# Patient Record
Sex: FEMALE | Race: WHITE | NOT HISPANIC OR LATINO | Employment: OTHER | URBAN - METROPOLITAN AREA
[De-identification: names, ages, dates, MRNs, and addresses within clinical notes are randomized per-mention and may not be internally consistent; named-entity substitution may affect disease eponyms.]

---

## 2017-03-16 ENCOUNTER — OFFICE VISIT (OUTPATIENT)
Dept: URGENT CARE | Facility: CLINIC | Age: 82
End: 2017-03-16
Payer: COMMERCIAL

## 2017-03-16 VITALS
DIASTOLIC BLOOD PRESSURE: 85 MMHG | RESPIRATION RATE: 18 BRPM | OXYGEN SATURATION: 95 % | HEART RATE: 84 BPM | TEMPERATURE: 97.8 F | SYSTOLIC BLOOD PRESSURE: 138 MMHG

## 2017-03-16 DIAGNOSIS — R06.02 SHORTNESS OF BREATH: ICD-10-CM

## 2017-03-16 DIAGNOSIS — J06.9 UPPER RESPIRATORY TRACT INFECTION, UNSPECIFIED TYPE: ICD-10-CM

## 2017-03-16 PROCEDURE — 99214 OFFICE O/P EST MOD 30 MIN: CPT | Mod: 25 | Performed by: PHYSICIAN ASSISTANT

## 2017-03-16 PROCEDURE — 4040F PNEUMOC VAC/ADMIN/RCVD: CPT | Mod: 8P | Performed by: PHYSICIAN ASSISTANT

## 2017-03-16 PROCEDURE — 4004F PT TOBACCO SCREEN RCVD TLK: CPT | Mod: 8P | Performed by: PHYSICIAN ASSISTANT

## 2017-03-16 PROCEDURE — G8421 BMI NOT CALCULATED: HCPCS | Performed by: PHYSICIAN ASSISTANT

## 2017-03-16 PROCEDURE — 94640 AIRWAY INHALATION TREATMENT: CPT | Performed by: PHYSICIAN ASSISTANT

## 2017-03-16 PROCEDURE — 1101F PT FALLS ASSESS-DOCD LE1/YR: CPT | Mod: 8P | Performed by: PHYSICIAN ASSISTANT

## 2017-03-16 PROCEDURE — G8484 FLU IMMUNIZE NO ADMIN: HCPCS | Performed by: PHYSICIAN ASSISTANT

## 2017-03-16 PROCEDURE — G8432 DEP SCR NOT DOC, RNG: HCPCS | Performed by: PHYSICIAN ASSISTANT

## 2017-03-16 RX ORDER — ALBUTEROL SULFATE 90 UG/1
2 AEROSOL, METERED RESPIRATORY (INHALATION) EVERY 6 HOURS PRN
Qty: 8.5 G | Refills: 0 | Status: CANCELLED | OUTPATIENT
Start: 2017-03-16

## 2017-03-16 RX ORDER — AZITHROMYCIN 250 MG/1
TABLET, FILM COATED ORAL
Qty: 6 TAB | Refills: 0 | Status: CANCELLED | OUTPATIENT
Start: 2017-03-16

## 2017-03-16 RX ORDER — AMOXICILLIN 500 MG/1
500 CAPSULE ORAL 2 TIMES DAILY
Qty: 14 CAP | Refills: 0 | Status: SHIPPED | OUTPATIENT
Start: 2017-03-16 | End: 2017-03-23

## 2017-03-16 RX ORDER — BENZONATATE 100 MG/1
100 CAPSULE ORAL 3 TIMES DAILY PRN
Qty: 60 CAP | Refills: 0 | Status: SHIPPED | OUTPATIENT
Start: 2017-03-16 | End: 2019-03-22 | Stop reason: CLARIF

## 2017-03-16 RX ORDER — IPRATROPIUM BROMIDE AND ALBUTEROL SULFATE 2.5; .5 MG/3ML; MG/3ML
3 SOLUTION RESPIRATORY (INHALATION) ONCE
Status: COMPLETED | OUTPATIENT
Start: 2017-03-16 | End: 2017-03-16

## 2017-03-16 RX ORDER — ALBUTEROL SULFATE 90 UG/1
2 AEROSOL, METERED RESPIRATORY (INHALATION) EVERY 6 HOURS PRN
Qty: 8.5 G | Refills: 0 | Status: SHIPPED | OUTPATIENT
Start: 2017-03-16 | End: 2019-03-22 | Stop reason: CLARIF

## 2017-03-16 RX ADMIN — IPRATROPIUM BROMIDE AND ALBUTEROL SULFATE 3 ML: 2.5; .5 SOLUTION RESPIRATORY (INHALATION) at 15:46

## 2017-03-16 NOTE — MR AVS SNAPSHOT
Lita Saunders   3/16/2017 3:15 PM   Office Visit   MRN: [id number]    Department:  [facility]   Dept Phone:  [phone]    Description:  Female : 1931   Provider:  Ana Yap PA-C           Reason for Visit     Cough Almost a week dry cough , chest congestion .      Allergies as of 3/16/2017     No Known Allergies      You were diagnosed with     Upper respiratory tract infection, unspecified type   [0420889]       Shortness of breath   [786.05.ICD-9-CM]         Vital Signs     Blood Pressure Pulse Temperature Respirations Oxygen Saturation       138/85 mmHg 84 36.6 °C (97.8 °F) 18 95%       Basic Information     Date Of Birth Sex Race Ethnicity Preferred Language    1931 Female White Non- English      Health Maintenance        Date Due Completion Dates    IMM DTaP/Tdap/Td Vaccine (1 - Tdap) 1950 ---    PAP SMEAR 1952 ---    MAMMOGRAM 1971 ---    COLONOSCOPY 1981 ---    IMM ZOSTER VACCINE 1991 ---    BONE DENSITY 1996 ---    IMM PNEUMOCOCCAL 65+ (ADULT) LOW/MEDIUM RISK SERIES (1 of 2 - PCV13) 1996 ---    IMM INFLUENZA (1) 2016 ---            Current Immunizations     No immunizations on file.      Below and/or attached are the medications your provider expects you to take. Review all of your home medications and newly ordered medications with your provider and/or pharmacist. Follow medication instructions as directed by your provider and/or pharmacist. Please keep your medication list with you and share with your provider. Update the information when medications are discontinued, doses are changed, or new medications (including over-the-counter products) are added; and carry medication information at all times in the event of emergency situations     Allergies:  No Known Allergies          Medications  Valid as of: 2017 -  5:03 PM    Generic Name Brand Name Tablet Size Instructions for use    Acetaminophen   Take  by  mouth.        Albuterol Sulfate (Aero Soln) albuterol 108 (90 BASE) MCG/ACT Inhale 2 Puffs by mouth every 6 hours as needed for Shortness of Breath.        Amoxicillin (Cap) AMOXIL 500 MG Take 1 Cap by mouth 2 times a day for 7 days.        Aspirin (Chew Tab) ASA 81 MG Take 81 mg by mouth every day.        Benzonatate (Cap) TESSALON 100 MG Take 1 Cap by mouth 3 times a day as needed for Cough.        Calcium   Take  by mouth.        Latanoprost (Solution) XALATAN 0.005 % Place 1 Drop in both eyes every evening.        Levothyroxine Sodium (Tab) SYNTHROID 75 MCG Take 75 mcg by mouth every day.        Metoprolol Succinate (TABLET SR 24 HR) TOPROL XL 50 MG Take 50 mg by mouth every day.        Multiple Vitamin   Take  by mouth.        Non Formulary Request Non Formulary Request  Refresh eye drops        Omega-3 Fatty Acids   Take  by mouth.        Simvastatin   Take  by mouth.        Timolol Maleate   by Ophthalmic route.        Warfarin Sodium (Tab) COUMADIN 5 MG Take 5 mg by mouth every day.        .                 Medicines prescribed today were sent to:     [facility]Y #[id number] - [place], [place] - [address]    [address] [place] [address]    Phone: [phone] Fax: [phone]    Open 24 Hours?: No      Medication refill instructions:       If your prescription bottle indicates you have medication refills left, it is not necessary to call your provider’s office. Please contact your pharmacy and they will refill your medication.    If your prescription bottle indicates you do not have any refills left, you may request refills at any time through one of the following ways: The online [employer] system (except Urgent Care), by calling your provider’s office, or by asking your pharmacy to contact your provider’s office with a refill request. Medication refills are processed only during regular business hours and may not be available until the next business day. Your provider may request additional information  or to have a follow-up visit with you prior to refilling your medication.   *Please Note: Medication refills are assigned a new Rx number when refilled electronically. Your pharmacy may indicate that no refills were authorized even though a new prescription for the same medication is available at the pharmacy. Please request the medicine by name with the pharmacy before contacting your provider for a refill.           [employer] Access Code: [email or web address]  Expires: 4/15/2017  5:03 PM    Your email address is not on file at [facility].  Email Addresses are required for you to sign up for [employer], please contact [phone] to verify your personal information and to provide your email address prior to attempting to register for [employer].    Lita Saunders  [address] [place] [address]    [employer]  A secure, online tool to manage your health information     [facility]’s [employer]® is a secure, online tool that connects you to your personalized health information from the privacy of your home -- day or night - making it very easy for you to manage your healthcare. Once the activation process is completed, you can even access your medical information using the [employer] letty, which is available for free in the Apple Letty store or Google Play store.     To learn more about [employer], visit [email or web address]    There are two levels of access available (as shown below):   My Chart Features  [facility]  Specialists [facility]  Urgent  Care Non-[facility] Primary Care Doctor   Email your healthcare team securely and privately 24/7 X X X    Manage appointments: schedule your next appointment; view details of past/upcoming appointments X      Request prescription refills. X      View recent personal medical records, including lab and immunizations X X X X   View health record, including health history, allergies, medications X X X X   Read reports about your outpatient visits, procedures, consult and  ER notes X X X X   See your discharge summary, which is a recap of your hospital and/or ER visit that includes your diagnosis, lab results, and care plan X X  X     How to register for [employer]:  Once your e-mail address has been verified, follow the following steps to sign up for [employer].     1. Go to  [email or web address]  2. Click on the Sign Up Now box, which takes you to the New Member Sign Up page. You will need to provide the following information:  a. Enter your [employer] Access Code exactly as it appears at the top of this page. (You will not need to use this code after you’ve completed the sign-up process. If you do not sign up before the expiration date, you must request a new code.)   b. Enter your date of birth.   c. Enter your home email address.   d. Click Submit, and follow the next screen’s instructions.  3. Create a [employer] ID. This will be your [employer] login ID and cannot be changed, so think of one that is secure and easy to remember.  4. Create a [employer] password. You can change your password at any time.  5. Enter your Password Reset Question and Answer. This can be used at a later time if you forget your password.   6. Enter your e-mail address. This allows you to receive e-mail notifications when new information is available in [employer].  7. Click Sign Up. You can now view your health information.    For assistance activating your [employer] account, call [phone]

## 2017-03-19 ASSESSMENT — ENCOUNTER SYMPTOMS
ABDOMINAL PAIN: 0
COUGH: 1
CHILLS: 0
VOMITING: 0
NAUSEA: 0
MUSCULOSKELETAL NEGATIVE: 1
SORE THROAT: 0
DIZZINESS: 0
RHINORRHEA: 1
SHORTNESS OF BREATH: 1
HEADACHES: 0
SPUTUM PRODUCTION: 0
DIARRHEA: 0
FEVER: 0

## 2017-03-19 ASSESSMENT — COPD QUESTIONNAIRES: COPD: 0

## 2017-03-19 NOTE — PROGRESS NOTES
Subjective:      Lita Saunders is a 85 y.o. female who presents with Cough            Cough  This is a new problem. The current episode started 1 to 4 weeks ago (1 week). The problem has been gradually worsening. The problem occurs every few minutes. The cough is non-productive. Associated symptoms include nasal congestion, rhinorrhea and shortness of breath. Pertinent negatives include no chest pain, chills, ear pain, fever, headaches or sore throat. The symptoms are aggravated by lying down. She has tried OTC cough suppressant and a beta-agonist inhaler for the symptoms. The treatment provided mild relief. Her past medical history is significant for pneumonia. There is no history of asthma or COPD.   Patient is here visiting family, and reports her cough has gotten significantly worse since getting here, and is causing her SOB. Reports history of pneumonia about 3 months ago that was treated in the outpatient setting. PM includes A fib and she has been taking Coumadin for many years with good control.    Review of Systems   Constitutional: Negative for fever and chills.   HENT: Positive for rhinorrhea. Negative for ear pain and sore throat.    Respiratory: Positive for cough and shortness of breath. Negative for sputum production.    Cardiovascular: Negative for chest pain.   Gastrointestinal: Negative for nausea, vomiting, abdominal pain and diarrhea.   Genitourinary: Negative.    Musculoskeletal: Negative.    Neurological: Negative for dizziness and headaches.   All other systems reviewed and are negative.         Objective:     /85 mmHg  Pulse 84  Temp(Src) 36.6 °C (97.8 °F)  Resp 18  SpO2 95%     Physical Exam   Constitutional: She is oriented to person, place, and time. She appears well-developed and well-nourished. No distress.   HENT:   Head: Normocephalic and atraumatic.   Right Ear: Hearing, tympanic membrane, external ear and ear canal normal.   Left Ear: Hearing, tympanic membrane, external  ear and ear canal normal.   Nose: Mucosal edema and rhinorrhea present.   Mouth/Throat: Oropharynx is clear and moist. No oropharyngeal exudate.   Eyes: Conjunctivae are normal. Pupils are equal, round, and reactive to light. Right eye exhibits no discharge. Left eye exhibits no discharge.   Neck: Normal range of motion.   Cardiovascular: Normal rate, regular rhythm and normal heart sounds.    No murmur heard.  Pulmonary/Chest: Effort normal. No respiratory distress. She has wheezes. She has rales.   Musculoskeletal: Normal range of motion.   Lymphadenopathy:     She has no cervical adenopathy.   Neurological: She is alert and oriented to person, place, and time.   Skin: Skin is warm and dry. She is not diaphoretic.   Psychiatric: She has a normal mood and affect. Her behavior is normal.   Nursing note and vitals reviewed.         .PMH:  has no past medical history on file.  MEDS:   Current outpatient prescriptions:   •  benzonatate (TESSALON) 100 MG Cap, Take 1 Cap by mouth 3 times a day as needed for Cough., Disp: 60 Cap, Rfl: 0  •  albuterol 108 (90 BASE) MCG/ACT Aero Soln inhalation aerosol, Inhale 2 Puffs by mouth every 6 hours as needed for Shortness of Breath., Disp: 8.5 g, Rfl: 0  •  amoxicillin (AMOXIL) 500 MG Cap, Take 1 Cap by mouth 2 times a day for 7 days., Disp: 14 Cap, Rfl: 0  •  metoprolol SR (TOPROL XL) 50 MG TB24, Take 50 mg by mouth every day., Disp: , Rfl:   •  Simvastatin (ZOCOR PO), Take  by mouth., Disp: , Rfl:   •  warfarin (COUMADIN) 5 MG TABS, Take 5 mg by mouth every day., Disp: , Rfl:   •  levothyroxine (SYNTHROID) 75 MCG TABS, Take 75 mcg by mouth every day., Disp: , Rfl:   •  latanoprost (XALATAN) 0.005 % SOLN, Place 1 Drop in both eyes every evening., Disp: , Rfl:   •  TIMOLOL MALEATE OP, by Ophthalmic route., Disp: , Rfl:   •  aspirin (ASA) 81 MG CHEW chewable tablet, Take 81 mg by mouth every day., Disp: , Rfl:   •  Acetaminophen (TYLENOL PO), Take  by mouth., Disp: , Rfl:   •   CALCIUM PO, Take  by mouth., Disp: , Rfl:   •  Multiple Vitamin (MULTI-VITAMIN PO), Take  by mouth., Disp: , Rfl:   •  Omega-3 Fatty Acids (OMEGA 3 PO), Take  by mouth., Disp: , Rfl:   •  Non Formulary Request, Refresh eye drops, Disp: , Rfl:   ALLERGIES: No Known Allergies  SURGHX: History reviewed. No pertinent past surgical history.  SOCHX:    FH: family history is not on file.       Assessment/Plan:     1. Upper respiratory tract infection, unspecified type  - benzonatate (TESSALON) 100 MG Cap; Take 1 Cap by mouth 3 times a day as needed for Cough.  Dispense: 60 Cap; Refill: 0  - amoxicillin (AMOXIL) 500 MG Cap; Take 1 Cap by mouth 2 times a day for 7 days.  Dispense: 14 Cap; Refill: 0   - Complete full course of antibiotics as prescribed       2. Shortness of breath  - ipratropium-albuterol (DUONEB) nebulizer solution 3 mL; 3 mL by Nebulization route Once.   - Given in clinic  - albuterol 108 (90 BASE) MCG/ACT Aero Soln inhalation aerosol; Inhale 2 Puffs by mouth every 6 hours as needed for Shortness of Breath.  Dispense: 8.5 g; Refill: 0      Call or return to office if symptoms persist or worsen

## 2019-03-22 ENCOUNTER — APPOINTMENT (OUTPATIENT)
Dept: RADIOLOGY | Facility: MEDICAL CENTER | Age: 84
DRG: 291 | End: 2019-03-22
Attending: EMERGENCY MEDICINE
Payer: MEDICARE

## 2019-03-22 ENCOUNTER — HOSPITAL ENCOUNTER (INPATIENT)
Facility: MEDICAL CENTER | Age: 84
LOS: 1 days | DRG: 291 | End: 2019-03-23
Attending: EMERGENCY MEDICINE | Admitting: INTERNAL MEDICINE
Payer: MEDICARE

## 2019-03-22 DIAGNOSIS — I50.9 ACUTE ON CHRONIC CONGESTIVE HEART FAILURE, UNSPECIFIED HEART FAILURE TYPE (HCC): ICD-10-CM

## 2019-03-22 DIAGNOSIS — R04.2 HEMOPTYSIS: ICD-10-CM

## 2019-03-22 DIAGNOSIS — J90 BILATERAL PLEURAL EFFUSION: ICD-10-CM

## 2019-03-22 DIAGNOSIS — R06.00 DYSPNEA, UNSPECIFIED TYPE: ICD-10-CM

## 2019-03-22 PROBLEM — I50.30 DIASTOLIC HEART FAILURE (HCC): Status: ACTIVE | Noted: 2019-03-22

## 2019-03-22 PROBLEM — I10 HYPERTENSION: Status: ACTIVE | Noted: 2019-03-22

## 2019-03-22 PROBLEM — J96.00 ACUTE RESPIRATORY FAILURE (HCC): Status: ACTIVE | Noted: 2019-03-22

## 2019-03-22 PROBLEM — J96.01 ACUTE RESPIRATORY FAILURE WITH HYPOXIA (HCC): Status: ACTIVE | Noted: 2019-03-22

## 2019-03-22 PROBLEM — I48.91 ATRIAL FIBRILLATION (HCC): Status: ACTIVE | Noted: 2019-03-22

## 2019-03-22 LAB
ALBUMIN SERPL BCP-MCNC: 3.4 G/DL (ref 3.2–4.9)
ALBUMIN/GLOB SERPL: 1 G/DL
ALP SERPL-CCNC: 58 U/L (ref 30–99)
ALT SERPL-CCNC: 21 U/L (ref 2–50)
ANION GAP SERPL CALC-SCNC: 5 MMOL/L (ref 0–11.9)
APTT PPP: 39.4 SEC (ref 24.7–36)
AST SERPL-CCNC: 30 U/L (ref 12–45)
BASOPHILS # BLD AUTO: 0.8 % (ref 0–1.8)
BASOPHILS # BLD: 0.06 K/UL (ref 0–0.12)
BILIRUB SERPL-MCNC: 0.9 MG/DL (ref 0.1–1.5)
BNP SERPL-MCNC: 393 PG/ML (ref 0–100)
BUN SERPL-MCNC: 16 MG/DL (ref 8–22)
CALCIUM SERPL-MCNC: 8.6 MG/DL (ref 8.4–10.2)
CHLORIDE SERPL-SCNC: 101 MMOL/L (ref 96–112)
CO2 SERPL-SCNC: 29 MMOL/L (ref 20–33)
CREAT SERPL-MCNC: 0.51 MG/DL (ref 0.5–1.4)
EKG IMPRESSION: NORMAL
EOSINOPHIL # BLD AUTO: 0.09 K/UL (ref 0–0.51)
EOSINOPHIL NFR BLD: 1.3 % (ref 0–6.9)
ERYTHROCYTE [DISTWIDTH] IN BLOOD BY AUTOMATED COUNT: 55.2 FL (ref 35.9–50)
GLOBULIN SER CALC-MCNC: 3.4 G/DL (ref 1.9–3.5)
GLUCOSE SERPL-MCNC: 115 MG/DL (ref 65–99)
HCT VFR BLD AUTO: 42.8 % (ref 37–47)
HGB BLD-MCNC: 13.5 G/DL (ref 12–16)
IMM GRANULOCYTES # BLD AUTO: 0.02 K/UL (ref 0–0.11)
IMM GRANULOCYTES NFR BLD AUTO: 0.3 % (ref 0–0.9)
INR PPP: 1.89 (ref 0.87–1.13)
LYMPHOCYTES # BLD AUTO: 0.84 K/UL (ref 1–4.8)
LYMPHOCYTES NFR BLD: 11.7 % (ref 22–41)
MCH RBC QN AUTO: 32.8 PG (ref 27–33)
MCHC RBC AUTO-ENTMCNC: 31.5 G/DL (ref 33.6–35)
MCV RBC AUTO: 103.9 FL (ref 81.4–97.8)
MONOCYTES # BLD AUTO: 0.81 K/UL (ref 0–0.85)
MONOCYTES NFR BLD AUTO: 11.3 % (ref 0–13.4)
NEUTROPHILS # BLD AUTO: 5.38 K/UL (ref 2–7.15)
NEUTROPHILS NFR BLD: 74.6 % (ref 44–72)
NRBC # BLD AUTO: 0 K/UL
NRBC BLD-RTO: 0 /100 WBC
PLATELET # BLD AUTO: 147 K/UL (ref 164–446)
PMV BLD AUTO: 10.4 FL (ref 9–12.9)
POTASSIUM SERPL-SCNC: 4.1 MMOL/L (ref 3.6–5.5)
PROCALCITONIN SERPL-MCNC: <0.05 NG/ML
PROT SERPL-MCNC: 6.8 G/DL (ref 6–8.2)
PROTHROMBIN TIME: 21.5 SEC (ref 12–14.6)
RBC # BLD AUTO: 4.12 M/UL (ref 4.2–5.4)
SODIUM SERPL-SCNC: 135 MMOL/L (ref 135–145)
TROPONIN I SERPL-MCNC: <0.02 NG/ML (ref 0–0.04)
WBC # BLD AUTO: 7.2 K/UL (ref 4.8–10.8)

## 2019-03-22 PROCEDURE — 85610 PROTHROMBIN TIME: CPT

## 2019-03-22 PROCEDURE — 700111 HCHG RX REV CODE 636 W/ 250 OVERRIDE (IP): Performed by: INTERNAL MEDICINE

## 2019-03-22 PROCEDURE — 770020 HCHG ROOM/CARE - TELE (206)

## 2019-03-22 PROCEDURE — 85025 COMPLETE CBC W/AUTO DIFF WBC: CPT

## 2019-03-22 PROCEDURE — A9270 NON-COVERED ITEM OR SERVICE: HCPCS | Performed by: INTERNAL MEDICINE

## 2019-03-22 PROCEDURE — 85730 THROMBOPLASTIN TIME PARTIAL: CPT

## 2019-03-22 PROCEDURE — 36415 COLL VENOUS BLD VENIPUNCTURE: CPT

## 2019-03-22 PROCEDURE — 304561 HCHG STAT O2

## 2019-03-22 PROCEDURE — 93005 ELECTROCARDIOGRAM TRACING: CPT | Performed by: EMERGENCY MEDICINE

## 2019-03-22 PROCEDURE — 84484 ASSAY OF TROPONIN QUANT: CPT

## 2019-03-22 PROCEDURE — 99285 EMERGENCY DEPT VISIT HI MDM: CPT

## 2019-03-22 PROCEDURE — 99223 1ST HOSP IP/OBS HIGH 75: CPT | Mod: AI | Performed by: INTERNAL MEDICINE

## 2019-03-22 PROCEDURE — 84145 PROCALCITONIN (PCT): CPT

## 2019-03-22 PROCEDURE — 71045 X-RAY EXAM CHEST 1 VIEW: CPT

## 2019-03-22 PROCEDURE — 700102 HCHG RX REV CODE 250 W/ 637 OVERRIDE(OP): Performed by: INTERNAL MEDICINE

## 2019-03-22 PROCEDURE — 80053 COMPREHEN METABOLIC PANEL: CPT

## 2019-03-22 PROCEDURE — 700117 HCHG RX CONTRAST REV CODE 255: Performed by: EMERGENCY MEDICINE

## 2019-03-22 PROCEDURE — 71275 CT ANGIOGRAPHY CHEST: CPT

## 2019-03-22 PROCEDURE — 83880 ASSAY OF NATRIURETIC PEPTIDE: CPT

## 2019-03-22 RX ORDER — LEVOTHYROXINE SODIUM 88 UG/1
88 TABLET ORAL
COMMUNITY

## 2019-03-22 RX ORDER — IRBESARTAN 75 MG/1
75 TABLET ORAL NIGHTLY
COMMUNITY

## 2019-03-22 RX ORDER — WARFARIN SODIUM 5 MG/1
2.5-5 TABLET ORAL DAILY
COMMUNITY
Start: 2019-02-01

## 2019-03-22 RX ORDER — BISACODYL 10 MG
10 SUPPOSITORY, RECTAL RECTAL
Status: DISCONTINUED | OUTPATIENT
Start: 2019-03-22 | End: 2019-03-23 | Stop reason: HOSPADM

## 2019-03-22 RX ORDER — POLYETHYLENE GLYCOL 3350 17 G/17G
1 POWDER, FOR SOLUTION ORAL
Status: DISCONTINUED | OUTPATIENT
Start: 2019-03-22 | End: 2019-03-23 | Stop reason: HOSPADM

## 2019-03-22 RX ORDER — AMOXICILLIN 250 MG
2 CAPSULE ORAL 2 TIMES DAILY
Status: DISCONTINUED | OUTPATIENT
Start: 2019-03-22 | End: 2019-03-23 | Stop reason: HOSPADM

## 2019-03-22 RX ORDER — LATANOPROST 50 UG/ML
1 SOLUTION/ DROPS OPHTHALMIC
COMMUNITY

## 2019-03-22 RX ORDER — HYDRALAZINE HYDROCHLORIDE 20 MG/ML
10 INJECTION INTRAMUSCULAR; INTRAVENOUS EVERY 4 HOURS PRN
Status: DISCONTINUED | OUTPATIENT
Start: 2019-03-22 | End: 2019-03-23 | Stop reason: HOSPADM

## 2019-03-22 RX ORDER — WARFARIN SODIUM 5 MG/1
5 TABLET ORAL
Status: COMPLETED | OUTPATIENT
Start: 2019-03-22 | End: 2019-03-22

## 2019-03-22 RX ORDER — BUDESONIDE AND FORMOTEROL FUMARATE DIHYDRATE 80; 4.5 UG/1; UG/1
2 AEROSOL RESPIRATORY (INHALATION)
Status: DISCONTINUED | OUTPATIENT
Start: 2019-03-23 | End: 2019-03-23 | Stop reason: HOSPADM

## 2019-03-22 RX ORDER — LEVOTHYROXINE SODIUM 88 UG/1
88 TABLET ORAL
Status: DISCONTINUED | OUTPATIENT
Start: 2019-03-23 | End: 2019-03-23 | Stop reason: HOSPADM

## 2019-03-22 RX ORDER — ROSUVASTATIN CALCIUM 10 MG/1
20 TABLET, COATED ORAL EVERY EVENING
Status: DISCONTINUED | OUTPATIENT
Start: 2019-03-22 | End: 2019-03-23 | Stop reason: HOSPADM

## 2019-03-22 RX ORDER — CHLORAL HYDRATE 500 MG
1000 CAPSULE ORAL
COMMUNITY

## 2019-03-22 RX ORDER — METOPROLOL SUCCINATE 50 MG/1
75 TABLET, EXTENDED RELEASE ORAL EVERY MORNING
COMMUNITY

## 2019-03-22 RX ORDER — BUDESONIDE AND FORMOTEROL FUMARATE DIHYDRATE 80; 4.5 UG/1; UG/1
2 AEROSOL RESPIRATORY (INHALATION) 2 TIMES DAILY
COMMUNITY

## 2019-03-22 RX ORDER — DORZOLAMIDE HYDROCHLORIDE AND TIMOLOL MALEATE 20; 5 MG/ML; MG/ML
1 SOLUTION/ DROPS OPHTHALMIC 2 TIMES DAILY
COMMUNITY

## 2019-03-22 RX ORDER — ROSUVASTATIN CALCIUM 20 MG/1
20 TABLET, COATED ORAL EVERY EVENING
COMMUNITY
Start: 2019-02-06

## 2019-03-22 RX ORDER — POTASSIUM CHLORIDE 20 MEQ/1
40 TABLET, EXTENDED RELEASE ORAL DAILY
Status: DISCONTINUED | OUTPATIENT
Start: 2019-03-22 | End: 2019-03-23

## 2019-03-22 RX ORDER — METOPROLOL SUCCINATE 25 MG/1
75 TABLET, EXTENDED RELEASE ORAL EVERY MORNING
Status: DISCONTINUED | OUTPATIENT
Start: 2019-03-23 | End: 2019-03-23 | Stop reason: HOSPADM

## 2019-03-22 RX ORDER — DORZOLAMIDE HYDROCHLORIDE AND TIMOLOL MALEATE 20; 5 MG/ML; MG/ML
1 SOLUTION/ DROPS OPHTHALMIC 2 TIMES DAILY
Status: DISCONTINUED | OUTPATIENT
Start: 2019-03-22 | End: 2019-03-23 | Stop reason: HOSPADM

## 2019-03-22 RX ORDER — LATANOPROST 50 UG/ML
1 SOLUTION/ DROPS OPHTHALMIC
Status: DISCONTINUED | OUTPATIENT
Start: 2019-03-22 | End: 2019-03-23 | Stop reason: HOSPADM

## 2019-03-22 RX ORDER — ACETAMINOPHEN 500 MG
1000 TABLET ORAL EVERY 6 HOURS PRN
COMMUNITY

## 2019-03-22 RX ORDER — IRBESARTAN 150 MG/1
75 TABLET ORAL NIGHTLY
Status: DISCONTINUED | OUTPATIENT
Start: 2019-03-22 | End: 2019-03-23 | Stop reason: HOSPADM

## 2019-03-22 RX ORDER — ACETAMINOPHEN 325 MG/1
650 TABLET ORAL EVERY 6 HOURS PRN
Status: DISCONTINUED | OUTPATIENT
Start: 2019-03-22 | End: 2019-03-23 | Stop reason: HOSPADM

## 2019-03-22 RX ORDER — FUROSEMIDE 10 MG/ML
40 INJECTION INTRAMUSCULAR; INTRAVENOUS DAILY
Status: DISCONTINUED | OUTPATIENT
Start: 2019-03-22 | End: 2019-03-23

## 2019-03-22 RX ORDER — ONDANSETRON 4 MG/1
4 TABLET, ORALLY DISINTEGRATING ORAL EVERY 4 HOURS PRN
Status: DISCONTINUED | OUTPATIENT
Start: 2019-03-22 | End: 2019-03-23 | Stop reason: HOSPADM

## 2019-03-22 RX ORDER — M-VIT,TX,IRON,MINS/CALC/FOLIC 27MG-0.4MG
1 TABLET ORAL DAILY
COMMUNITY

## 2019-03-22 RX ORDER — ONDANSETRON 2 MG/ML
4 INJECTION INTRAMUSCULAR; INTRAVENOUS EVERY 4 HOURS PRN
Status: DISCONTINUED | OUTPATIENT
Start: 2019-03-22 | End: 2019-03-23 | Stop reason: HOSPADM

## 2019-03-22 RX ADMIN — POTASSIUM CHLORIDE 40 MEQ: 1500 TABLET, EXTENDED RELEASE ORAL at 19:22

## 2019-03-22 RX ADMIN — FUROSEMIDE 40 MG: 10 INJECTION, SOLUTION INTRAMUSCULAR; INTRAVENOUS at 19:21

## 2019-03-22 RX ADMIN — LATANOPROST 1 DROP: 50 SOLUTION/ DROPS OPHTHALMIC at 20:43

## 2019-03-22 RX ADMIN — ROSUVASTATIN CALCIUM 20 MG: 10 TABLET, FILM COATED ORAL at 19:23

## 2019-03-22 RX ADMIN — WARFARIN SODIUM 5 MG: 5 TABLET ORAL at 19:22

## 2019-03-22 RX ADMIN — IRBESARTAN 75 MG: 150 TABLET ORAL at 20:43

## 2019-03-22 RX ADMIN — IOHEXOL 100 ML: 350 INJECTION, SOLUTION INTRAVENOUS at 14:04

## 2019-03-22 RX ADMIN — DORZOLAMIDE HYDROCHLORIDE AND TIMOLOL MALEATE 1 DROP: 20; 5 SOLUTION/ DROPS OPHTHALMIC at 19:28

## 2019-03-22 RX ADMIN — GUAIFENESIN 200 MG: 100 SOLUTION ORAL at 22:04

## 2019-03-22 ASSESSMENT — CHA2DS2 SCORE
DIABETES: NO
SEX: FEMALE
AGE 75 OR GREATER: YES
CHF OR LEFT VENTRICULAR DYSFUNCTION: NO
PRIOR STROKE OR TIA OR THROMBOEMBOLISM: NO
CHA2DS2 VASC SCORE: 4
HYPERTENSION: YES
VASCULAR DISEASE: NO
AGE 65 TO 74: NO

## 2019-03-22 ASSESSMENT — COPD QUESTIONNAIRES
DO YOU EVER COUGH UP ANY MUCUS OR PHLEGM?: NO/ONLY WITH OCCASIONAL COLDS OR INFECTIONS
DURING THE PAST 4 WEEKS HOW MUCH DID YOU FEEL SHORT OF BREATH: SOME OF THE TIME
HAVE YOU SMOKED AT LEAST 100 CIGARETTES IN YOUR ENTIRE LIFE: NO/DON'T KNOW
COPD SCREENING SCORE: 3

## 2019-03-22 ASSESSMENT — COGNITIVE AND FUNCTIONAL STATUS - GENERAL
CLIMB 3 TO 5 STEPS WITH RAILING: A LITTLE
DAILY ACTIVITIY SCORE: 24
MOBILITY SCORE: 23
SUGGESTED CMS G CODE MODIFIER DAILY ACTIVITY: CH
SUGGESTED CMS G CODE MODIFIER MOBILITY: CI

## 2019-03-22 ASSESSMENT — ENCOUNTER SYMPTOMS
SHORTNESS OF BREATH: 1
DEPRESSION: 0
HEADACHES: 0
ABDOMINAL PAIN: 0
LOSS OF CONSCIOUSNESS: 0
MYALGIAS: 0
DIARRHEA: 0
COUGH: 1
SPUTUM PRODUCTION: 0
VOMITING: 0
STRIDOR: 0
CONSTIPATION: 0
TINGLING: 0
PALPITATIONS: 0
CHILLS: 0
FALLS: 0
WEAKNESS: 0
NAUSEA: 0
DIZZINESS: 0
FEVER: 0

## 2019-03-22 ASSESSMENT — LIFESTYLE VARIABLES
CONSUMPTION TOTAL: INCOMPLETE
DO YOU DRINK ALCOHOL: NO
HOW MANY TIMES IN THE PAST YEAR HAVE YOU HAD 5 OR MORE DRINKS IN A DAY: 0
ALCOHOL_USE: YES
EVER HAD A DRINK FIRST THING IN THE MORNING TO STEADY YOUR NERVES TO GET RID OF A HANGOVER: NO
TOTAL SCORE: 0
TOTAL SCORE: 0
ON A TYPICAL DAY WHEN YOU DRINK ALCOHOL HOW MANY DRINKS DO YOU HAVE: 1
HAVE PEOPLE ANNOYED YOU BY CRITICIZING YOUR DRINKING: NO
TOTAL SCORE: 0
EVER_SMOKED: NEVER
TOTAL SCORE: 0
EVER FELT BAD OR GUILTY ABOUT YOUR DRINKING: NO
AVERAGE NUMBER OF DAYS PER WEEK YOU HAVE A DRINK CONTAINING ALCOHOL: 4
HAVE YOU EVER FELT YOU SHOULD CUT DOWN ON YOUR DRINKING: NO
EVER FELT BAD OR GUILTY ABOUT YOUR DRINKING: NO
EVER HAD A DRINK FIRST THING IN THE MORNING TO STEADY YOUR NERVES TO GET RID OF A HANGOVER: NO
ALCOHOL_USE: YES
EVER_SMOKED: NEVER
CONSUMPTION TOTAL: NEGATIVE
HAVE YOU EVER FELT YOU SHOULD CUT DOWN ON YOUR DRINKING: NO
TOTAL SCORE: 0
TOTAL SCORE: 0
HAVE PEOPLE ANNOYED YOU BY CRITICIZING YOUR DRINKING: NO

## 2019-03-22 ASSESSMENT — PATIENT HEALTH QUESTIONNAIRE - PHQ9
1. LITTLE INTEREST OR PLEASURE IN DOING THINGS: NOT AT ALL
SUM OF ALL RESPONSES TO PHQ9 QUESTIONS 1 AND 2: 0
2. FEELING DOWN, DEPRESSED, IRRITABLE, OR HOPELESS: NOT AT ALL

## 2019-03-22 NOTE — ED NOTES
Med Rec completed per patient  Allergies reviewed  No ORAL antibiotics in last 30 days    Patient takes Coumadin as follows:2.5 MG MON,WED,FRI  5 MG all other days, last INR check 3 weeks ago

## 2019-03-22 NOTE — ED PROVIDER NOTES
"ED Provider Note    CHIEF COMPLAINT  Chief Complaint   Patient presents with   • Cough       HPI  Lita Saunders is a 87 y.o. female who to the emergency department through triage with family members for cough and shortness of breath.  Patient with history of \"interstitial lung disease\" denies diagnosis specifically for emphysema, COPD, asthma.  She has had previous \"heart failure\" as well, no longer taking a diuretic.  Patient is visiting from [place], she has been here for 3 weeks, she describes now 1 week of nonproductive cough and some shortness of breath.  Both seem worse at night although noticeable with some exertion as well.  URI symptoms mild.  No fever.  Some increased lower extremity edema although mostly baseline for this patient.  Patient became concerned with episode of hemoptysis this morning.  No chest pain, palpitations.  No syncope.  No other bleeding diatheses, hematuria, rectal bleeding, gum bleeding.    Patient is on Coumadin for atrial fibrillation.  INR reportedly normal prior to her trip to [place].    REVIEW OF SYSTEMS  See HPI for further details. All other systems are negative.     PAST MEDICAL HISTORY   has a past medical history of Atrial fibrillation (HCC) and Hypertension.    SOCIAL HISTORY  Social History     Social History Main Topics   • Smoking status: Never Smoker   • Smokeless tobacco: Never Used   • Alcohol use Yes      Comment: Occasionally   • Drug use: No   • Sexual activity: Not on file       SURGICAL HISTORY  patient denies any surgical history    CURRENT MEDICATIONS  Home Medications     Reviewed by Yuan Umanzor (Pharmacy Tech) on 03/22/19 at 1236  Med List Status: Complete   Medication Last Dose Status   acetaminophen (TYLENOL) 500 MG Tab 3/21/2019 Active   aspirin EC (ECOTRIN) 81 MG Tablet Delayed Response 3/22/2019 Active   budesonide-formoterol (SYMBICORT) 80-4.5 MCG/ACT Aerosol 3/22/2019 Active   Calcium Carb-Cholecalciferol (CALCIUM-VITAMIN D) 600-400 MG-UNIT " "Tab 3/21/2019 Active   dorzolamide-timolol (COSOPT) 22.3-6.8 MG/ML Solution 3/22/2019 Active   irbesartan (AVAPRO) 75 MG tablet 3/21/2019 Active   latanoprost (XALATAN) 0.005 % Solution 3/21/2019 Active   levothyroxine (SYNTHROID) 88 MCG Tab 3/22/2019 Active   metoprolol SR (TOPROL XL) 50 MG TABLET SR 24 HR 3/22/2019 Active   Omega-3 Fatty Acids (FISH OIL) 1000 MG Cap capsule 3/22/2019 Active   rosuvastatin (CRESTOR) 20 MG Tab 3/21/2019 Active   therapeutic multivitamin-minerals (THERAGRAN-M) Tab 3/21/2019 Active   warfarin (COUMADIN) 5 MG Tab 3/21/2019 Active                ALLERGIES  No Known Allergies    PHYSICAL EXAM  VITAL SIGNS: BP (!) 171/98   Pulse 63   Temp 36.6 °C (97.8 °F) (Temporal)   Resp (!) 22   Ht 1.753 m (5' 9\")   Wt 77 kg (169 lb 12.1 oz)   SpO2 97%   BMI 25.07 kg/m²   Pulse ox interpretation: I interpret this pulse ox as normal.  Constitutional: Alert in no apparent distress.  HENT: Normocephalic, atraumatic. Bilateral external ears normal, Nose normal.  Slightly dry mucous membranes.    Eyes: Pupils are equal and reactive, Conjunctiva normal.   Neck: Normal range of motion, Supple.  Mild JVD.  Lymphatic: No lymphadenopathy noted.   Cardiovascular: Regular rate and rhythm, no murmurs. Distal pulses intact.  1+ pitting bilateral lower extremity pedal edema.  Thorax & Lungs: Diminished bilaterally, greatest in the bases.  No wheezing/rales/ronchi. No increased work of breathing, clipped speech or retractions.   Abdomen: Soft, non-distended, non-tender to palpation. No palpable or pulsatile masses.   Skin: Warm, Dry, No erythema, No rash.   Musculoskeletal: Good range of motion in all major joints.  Neurologic: Alert and oriented x4.  Moves 4 extremity spontaneously.  Psychiatric: Affect normal, Judgment normal, Mood normal.       DIAGNOSTIC STUDIES / PROCEDURES    LABS  Results for orders placed or performed during the hospital encounter of 03/22/19   CBC w/ Differential   Result Value Ref " Range    WBC 7.2 4.8 - 10.8 K/uL    RBC 4.12 (L) 4.20 - 5.40 M/uL    Hemoglobin 13.5 12.0 - 16.0 g/dL    Hematocrit 42.8 37.0 - 47.0 %    .9 (H) 81.4 - 97.8 fL    MCH 32.8 27.0 - 33.0 pg    MCHC 31.5 (L) 33.6 - 35.0 g/dL    RDW 55.2 (H) 35.9 - 50.0 fL    Platelet Count 147 (L) 164 - 446 K/uL    MPV 10.4 9.0 - 12.9 fL    Neutrophils-Polys 74.60 (H) 44.00 - 72.00 %    Lymphocytes 11.70 (L) 22.00 - 41.00 %    Monocytes 11.30 0.00 - 13.40 %    Eosinophils 1.30 0.00 - 6.90 %    Basophils 0.80 0.00 - 1.80 %    Immature Granulocytes 0.30 0.00 - 0.90 %    Nucleated RBC 0.00 /100 WBC    Neutrophils (Absolute) 5.38 2.00 - 7.15 K/uL    Lymphs (Absolute) 0.84 (L) 1.00 - 4.80 K/uL    Monos (Absolute) 0.81 0.00 - 0.85 K/uL    Eos (Absolute) 0.09 0.00 - 0.51 K/uL    Baso (Absolute) 0.06 0.00 - 0.12 K/uL    Immature Granulocytes (abs) 0.02 0.00 - 0.11 K/uL    NRBC (Absolute) 0.00 K/uL   Complete Metabolic Panel (CMP)   Result Value Ref Range    Sodium 135 135 - 145 mmol/L    Potassium 4.1 3.6 - 5.5 mmol/L    Chloride 101 96 - 112 mmol/L    Co2 29 20 - 33 mmol/L    Anion Gap 5.0 0.0 - 11.9    Glucose 115 (H) 65 - 99 mg/dL    Bun 16 8 - 22 mg/dL    Creatinine 0.51 0.50 - 1.40 mg/dL    Calcium 8.6 8.4 - 10.2 mg/dL    AST(SGOT) 30 12 - 45 U/L    ALT(SGPT) 21 2 - 50 U/L    Alkaline Phosphatase 58 30 - 99 U/L    Total Bilirubin 0.9 0.1 - 1.5 mg/dL    Albumin 3.4 3.2 - 4.9 g/dL    Total Protein 6.8 6.0 - 8.2 g/dL    Globulin 3.4 1.9 - 3.5 g/dL    A-G Ratio 1.0 g/dL   Btype Natriuretic Peptide   Result Value Ref Range    B Natriuretic Peptide 393 (H) 0 - 100 pg/mL   Troponin STAT   Result Value Ref Range    Troponin I <0.02 0.00 - 0.04 ng/mL   APTT   Result Value Ref Range    APTT 39.4 (H) 24.7 - 36.0 sec   PT/INR   Result Value Ref Range    PT 21.5 (H) 12.0 - 14.6 sec    INR 1.89 (H) 0.87 - 1.13   ESTIMATED GFR   Result Value Ref Range    GFR If African American >60 >60 mL/min/1.73 m 2    GFR If Non  >60 >60  mL/min/1.73 m 2   EKG   Result Value Ref Range    Report       [facility] Emergency Dept.    Test Date:  2019  Pt Name:    CAMERON KWOK                Department: [facility]  MRN:        [id number]                      Room:       -ROOM 1  Gender:     Female                       Technician: CARTER  :        [id number]                   Requested By:AMBER SALDANA  Order #:    [id number]                    Reading MD: AMBER SALDANA DO    Measurements  Intervals                                Axis  Rate:       69                           P:  NV:                                      QRS:        -48  QRSD:       144                          T:          110  QT:         427  QTc:        458    Interpretive Statements  Atrial fibrillation  Ventricular premature complex  Left bundle branch block  No previous ECG available for comparison    Electronically Signed On 3- 16:29:54 PDT by AMBER SALDANA DO         RADIOLOGY  CT-CTA CHEST PULMONARY ARTERY W/ RECONS   Final Result      1.  No CT evidence for pulmonary emboli.   2.  Multichamber cardiac enlargement primarily involving the atria with findings suggesting pulmonary hypertension.   3.  Patchy hazy opacities throughout both lungs which may indicate pneumonitis and/or interstitial lung disease.   4.  Small bilateral pleural effusions, larger on the RIGHT.            DX-CHEST-PORTABLE (1 VIEW)   Final Result      Interstitial greater than consolidative edema with underlying interstitial lung disease not excluded. Superimposed pneumonia is also possible      Marked cardiac silhouette enlargement            COURSE & MEDICAL DECISION MAKING  Nursing notes and vital signs were reviewed. (See chart for details)  The patients records were reviewed, history was obtained from the patient;     Differential diagnosis includes bronchitis, pneumonia, pulmonary embolism, pulmonary edema.    ED evaluation for cough, shortness of breath and  episode of hemoptysis most concerning for pulmonary edema, suspect CHF exacerbation, given patient's history for the same.  She does have interstitial lung disease, no clinical evidence for acute COPD exacerbation, however given mostly nonproductive cough I initially suspected this may be a bronchitis.  Evidence for pulmonary edema, bilateral pleural effusions, elevated BNP and peripheral edema with notable shortness of breath more likely CHF exacerbation.  Patient is no longer on diuretic,, she is symptomatic, I believe she does need IV diuresis before stable for discharge.  EKG atrial fibrillation, rate controlled, this is chronic, left bundle branch block, unknown chronicity.  However patient denies chest pain.  No evidence for STEMI, troponin normal.  No further evidence for pneumonia or sepsis.  No pulmonary embolism despite somewhat subtherapeutic INR.  Patient is somewhat hypertensive, but afebrile.  Never hypoxic, on baseline supplemental oxygen.  She is aware the findings and agreeable to the disposition plan.    Dr. Singh is aware of the patient and agreeable to admission.    FINAL IMPRESSION  (I50.9) Acute on chronic congestive heart failure, unspecified heart failure type (HCC)  (J90) Bilateral pleural effusion  (R06.00) Dyspnea, unspecified type  (R04.2) Hemoptysis      Electronically signed by: Muna Aranda, 3/22/2019 4:25 PM      This dictation was created using voice recognition software. The accuracy of the dictation is limited to the abilities of the software. I expect there may be some errors of grammar and possibly content. The nursing notes were reviewed and certain aspects of this information were incorporated into this note.

## 2019-03-22 NOTE — ED TRIAGE NOTES
"Pt is accompanied by her family.  She is visiting her son in [place], from [place].  She reports a hx of Afib, on chronic Coumadin therapy.  She also reports being affected by \"Interstitial Lung Disease\", and on chronic O2 supplementation.  She presents today complaining of a productive cough worsening for the past 3 weeks and acute onset of hemoptysis today.   Chief Complaint   Patient presents with   • Cough     BP (!) 171/98   Pulse 100   Temp 36.6 °C (97.8 °F) (Temporal)   Resp 20   Ht 1.753 m (5' 9\")   Wt 77 kg (169 lb 12.1 oz)   SpO2 92%   BMI 25.07 kg/m²     "

## 2019-03-23 VITALS
DIASTOLIC BLOOD PRESSURE: 88 MMHG | SYSTOLIC BLOOD PRESSURE: 131 MMHG | BODY MASS INDEX: 25.36 KG/M2 | WEIGHT: 167.33 LBS | RESPIRATION RATE: 20 BRPM | HEART RATE: 66 BPM | TEMPERATURE: 97.8 F | HEIGHT: 68 IN | OXYGEN SATURATION: 95 %

## 2019-03-23 LAB
ANION GAP SERPL CALC-SCNC: 9 MMOL/L (ref 0–11.9)
BUN SERPL-MCNC: 10 MG/DL (ref 8–22)
CALCIUM SERPL-MCNC: 9.3 MG/DL (ref 8.4–10.2)
CHLORIDE SERPL-SCNC: 100 MMOL/L (ref 96–112)
CO2 SERPL-SCNC: 32 MMOL/L (ref 20–33)
CREAT SERPL-MCNC: 0.56 MG/DL (ref 0.5–1.4)
ERYTHROCYTE [DISTWIDTH] IN BLOOD BY AUTOMATED COUNT: 55.8 FL (ref 35.9–50)
GLUCOSE SERPL-MCNC: 104 MG/DL (ref 65–99)
HCT VFR BLD AUTO: 40.4 % (ref 37–47)
HGB BLD-MCNC: 12.9 G/DL (ref 12–16)
INR PPP: 1.96 (ref 0.87–1.13)
MCH RBC QN AUTO: 33.2 PG (ref 27–33)
MCHC RBC AUTO-ENTMCNC: 31.9 G/DL (ref 33.6–35)
MCV RBC AUTO: 103.9 FL (ref 81.4–97.8)
PLATELET # BLD AUTO: 148 K/UL (ref 164–446)
PMV BLD AUTO: 10.6 FL (ref 9–12.9)
POTASSIUM SERPL-SCNC: 3.7 MMOL/L (ref 3.6–5.5)
PROTHROMBIN TIME: 22.1 SEC (ref 12–14.6)
RBC # BLD AUTO: 3.89 M/UL (ref 4.2–5.4)
SODIUM SERPL-SCNC: 141 MMOL/L (ref 135–145)
WBC # BLD AUTO: 7.2 K/UL (ref 4.8–10.8)

## 2019-03-23 PROCEDURE — 700102 HCHG RX REV CODE 250 W/ 637 OVERRIDE(OP): Performed by: INTERNAL MEDICINE

## 2019-03-23 PROCEDURE — A9270 NON-COVERED ITEM OR SERVICE: HCPCS | Performed by: INTERNAL MEDICINE

## 2019-03-23 PROCEDURE — 700111 HCHG RX REV CODE 636 W/ 250 OVERRIDE (IP): Performed by: INTERNAL MEDICINE

## 2019-03-23 PROCEDURE — 85610 PROTHROMBIN TIME: CPT

## 2019-03-23 PROCEDURE — 85027 COMPLETE CBC AUTOMATED: CPT

## 2019-03-23 PROCEDURE — 80048 BASIC METABOLIC PNL TOTAL CA: CPT

## 2019-03-23 PROCEDURE — 99239 HOSP IP/OBS DSCHRG MGMT >30: CPT | Performed by: HOSPITALIST

## 2019-03-23 RX ORDER — FUROSEMIDE 10 MG/ML
40 INJECTION INTRAMUSCULAR; INTRAVENOUS
Status: DISCONTINUED | OUTPATIENT
Start: 2019-03-23 | End: 2019-03-23 | Stop reason: HOSPADM

## 2019-03-23 RX ORDER — FUROSEMIDE 40 MG/1
40 TABLET ORAL DAILY
Qty: 30 TAB | Refills: 1 | Status: SHIPPED | OUTPATIENT
Start: 2019-03-23

## 2019-03-23 RX ORDER — POTASSIUM CHLORIDE 20 MEQ/1
40 TABLET, EXTENDED RELEASE ORAL DAILY
Qty: 30 TAB | Refills: 1 | Status: SHIPPED | OUTPATIENT
Start: 2019-03-23

## 2019-03-23 RX ORDER — WARFARIN SODIUM 2 MG/1
4 TABLET ORAL
Status: DISCONTINUED | OUTPATIENT
Start: 2019-03-23 | End: 2019-03-23 | Stop reason: HOSPADM

## 2019-03-23 RX ORDER — POTASSIUM CHLORIDE 20 MEQ/1
40 TABLET, EXTENDED RELEASE ORAL 2 TIMES DAILY
Status: DISCONTINUED | OUTPATIENT
Start: 2019-03-23 | End: 2019-03-23 | Stop reason: HOSPADM

## 2019-03-23 RX ADMIN — POTASSIUM CHLORIDE 40 MEQ: 1500 TABLET, EXTENDED RELEASE ORAL at 06:12

## 2019-03-23 RX ADMIN — FUROSEMIDE 40 MG: 10 INJECTION, SOLUTION INTRAMUSCULAR; INTRAVENOUS at 06:13

## 2019-03-23 RX ADMIN — GUAIFENESIN 200 MG: 100 SOLUTION ORAL at 06:23

## 2019-03-23 RX ADMIN — METOPROLOL SUCCINATE 75 MG: 25 TABLET, EXTENDED RELEASE ORAL at 05:47

## 2019-03-23 RX ADMIN — ASPIRIN 81 MG: 81 TABLET, COATED ORAL at 05:48

## 2019-03-23 RX ADMIN — LEVOTHYROXINE SODIUM 88 MCG: 88 TABLET ORAL at 05:48

## 2019-03-23 RX ADMIN — DORZOLAMIDE HYDROCHLORIDE AND TIMOLOL MALEATE 1 DROP: 20; 5 SOLUTION/ DROPS OPHTHALMIC at 05:50

## 2019-03-23 NOTE — PROGRESS NOTES
Telemetry Shift Summary    Rhythm SR  HR Range 60-72  Ectopy Rare PVC  Measurements 0.14/0.08/0.44        Normal Values  Rhythm SR  HR Range    Measurements 0.12-0.20 / 0.06-0.10  / 0.30-0.52

## 2019-03-23 NOTE — PROGRESS NOTES
Tele Summary:    Rhythm: A fib  Rate: 60s-70s  Ectopy: frequent PVCs, rare couplet, rare bigeminy  Measurements: -/.12/-    Tele summary placed in pt chart

## 2019-03-23 NOTE — ED NOTES
Pt transported to floor via gu; on zoll. Pt assisted to hosp bed via front-wheel walker, 02 2L via nc, stand-by assist

## 2019-03-23 NOTE — PROGRESS NOTES
Report received Ayana ADAMES. Assumed care of pt. A/O x4. VSS. Responds appropriately. Denies pain, mild SOB with exertion. Assessment completed. Explained importance of calling before getting OOB. Call light and belongings within reach. Bed in the lowest position. Treaded socks in place. Hourly rounding in progress. Safety precautions in place

## 2019-03-23 NOTE — ASSESSMENT & PLAN NOTE
-Due to bilateral pleural effusions, right greater than left  -Continue oxygen, wean as able, patient quickly desaturates without

## 2019-03-23 NOTE — PROGRESS NOTES
Inpatient Anticoagulation Service Note    Date: 3/22/2019  Reason for Anticoagulation: Atrial Fibrillation   [id number] VASc Score: 4        Hemoglobin Value: 13.5  Hematocrit Value: 42.8  Lab Platelet Value: (!) 147  Target INR: 2.0 to 3.0    INR from last 7 days     Date/Time INR Value    03/22/19 1225 (!)  1.89        Dose from last 7 days     Date/Time Dose (mg)    03/22/19 1742  5        Significant Interactions: Aspirin, Statin  Bridge Therapy: No (If less than 5 days and overlap therapy discontinued -- document reason (i.e. Bleed Risk))    (If still on overlap therapy, if No -- document reason (i.e. Bleed Risk))    Comments: Home dose 2.5 mg MWF, 5 mg on all other days.    Plan:  Will give warfarin 5 mg po tonight for INR of 1.89 per home regimen  Education Material Provided?: No (long term warfarin patient.)  Pharmacist suggested discharge dosing: Resume home regimen.     Kin Orozco [facility]

## 2019-03-23 NOTE — PROGRESS NOTES
Received report and assumed care of pt. Pt is alert and oriented with family at bedside. Discussed POC with pt and family. Gave night medications. Pt has no complaints of pain at this time. She calls appropriately when in need of assistance and before attempting to get out of bed.

## 2019-03-23 NOTE — DISCHARGE INSTRUCTIONS
Discharge Instructions    Discharged to home by car with relative. Discharged via wheelchair, hospital escort: Yes.  Special equipment needed: Oxygen    Be sure to schedule a follow-up appointment with your primary care doctor or any specialists as instructed.     Discharge Plan:   Diet Plan: Discussed  Activity Level: Discussed  Confirmed Follow up Appointment: Patient to Call and Schedule Appointment  Confirmed Symptoms Management: Discussed  Medication Reconciliation Updated: Yes  Pneumococcal Vaccine Administered/Refused: Not given - Patient refused pneumococcal vaccine  Influenza Vaccine Indication: Not indicated: Previously immunized this influenza season and > 8 years of age    I understand that a diet low in cholesterol, fat, and sodium is recommended for good health. Unless I have been given specific instructions below for another diet, I accept this instruction as my diet prescription.   Other diet: Cardiac low sodium    Special Instructions: None      · Is patient discharged on Warfarin / Coumadin?   Yes    You are receiving the drug warfarin. Please understand the importance of monitoring warfarin with scheduled PT/INR blood draws.  Follow-up with a call to your personal Doctor's office in 3 days to schedule a PT/INR.    IMPORTANT: HOW TO USE THIS INFORMATION:  This is a summary and does NOT have all possible information about this product. This information does not assure that this product is safe, effective, or appropriate for you. This information is not individual medical advice and does not substitute for the advice of your health care professional. Always ask your health care professional for complete information about this product and your specific health needs.      WARFARIN - ORAL (WARF-uh-rin)      COMMON BRAND NAME(S): Coumadin      WARNING:  Warfarin can cause very serious (possibly fatal) bleeding. This is more likely to occur when you first start taking this medication or if you take too  "much warfarin. To decrease your risk for bleeding, your doctor or other health care provider will monitor you closely and check your lab results (INR test) to make sure you are not taking too much warfarin. Keep all medical and laboratory appointments. Tell your doctor right away if you notice any signs of serious bleeding. See also Side Effects section.      USES:  This medication is used to treat blood clots (such as in deep vein thrombosis-DVT or pulmonary embolus-PE) and/or to prevent new clots from forming in your body. Preventing harmful blood clots helps to reduce the risk of a stroke or heart attack. Conditions that increase your risk of developing blood clots include a certain type of irregular heart rhythm (atrial fibrillation), heart valve replacement, recent heart attack, and certain surgeries (such as hip/knee replacement). Warfarin is commonly called a \"blood thinner,\" but the more correct term is \"anticoagulant.\" It helps to keep blood flowing smoothly in your body by decreasing the amount of certain substances (clotting proteins) in your blood.      HOW TO USE:  Read the Medication Guide provided by your pharmacist before you start taking warfarin and each time you get a refill. If you have any questions, ask your doctor or pharmacist. Take this medication by mouth with or without food as directed by your doctor or other health care professional, usually once a day. It is very important to take it exactly as directed. Do not increase the dose, take it more frequently, or stop using it unless directed by your doctor. Dosage is based on your medical condition, laboratory tests (such as INR), and response to treatment. Your doctor or other health care provider will monitor you closely while you are taking this medication to determine the right dose for you. Use this medication regularly to get the most benefit from it. To help you remember, take it at the same time each day. It is important to eat a " balanced, consistent diet while taking warfarin. Some foods can affect how warfarin works in your body and may affect your treatment and dose. Avoid sudden large increases or decreases in your intake of foods high in vitamin K (such as broccoli, cauliflower, cabbage, brussels sprouts, kale, spinach, and other green leafy vegetables, liver, green tea, certain vitamin supplements). If you are trying to lose weight, check with your doctor before you try to go on a diet. Cranberry products may also affect how your warfarin works. Limit the amount of cranberry juice (16 ounces/480 milliliters a day) or other cranberry products you may drink or eat.      SIDE EFFECTS:  Nausea, loss of appetite, or stomach/abdominal pain may occur. If any of these effects persist or worsen, tell your doctor or pharmacist promptly. Remember that your doctor has prescribed this medication because he or she has judged that the benefit to you is greater than the risk of side effects. Many people using this medication do not have serious side effects. This medication can cause serious bleeding if it affects your blood clotting proteins too much (shown by unusually high INR lab results). Even if your doctor stops your medication, this risk of bleeding can continue for up to a week. Tell your doctor right away if you have any signs of serious bleeding, including: unusual pain/swelling/discomfort, unusual/easy bruising, prolonged bleeding from cuts or gums, persistent/frequent nosebleeds, unusually heavy/prolonged menstrual flow, pink/dark urine, coughing up blood, vomit that is bloody or looks like coffee grounds, severe headache, dizziness/fainting, unusual or persistent tiredness/weakness, bloody/black/tarry stools, chest pain, shortness of breath, difficulty swallowing. Tell your doctor right away if any of these unlikely but serious side effects occur: persistent nausea/vomiting, severe stomach/abdominal pain, yellowing eyes/skin. This drug  rarely has caused very serious (possibly fatal) problems if its effects lead to small blood clots (usually at the beginning of treatment). This can lead to severe skin/tissue damage that may require surgery or amputation if left untreated. Patients with certain blood conditions (protein C or S deficiency) may be at greater risk. Get medical help right away if any of these rare but serious side effects occur: painful/red/purplish patches on the skin (such as on the toe, breast, abdomen), change in the amount of urine, vision changes, confusion, slurred speech, weakness on one side of the body. A very serious allergic reaction to this drug is rare. However, get medical help right away if you notice any symptoms of a serious allergic reaction, including: rash, itching/swelling (especially of the face/tongue/throat), severe dizziness, trouble breathing. This is not a complete list of possible side effects. If you notice other effects not listed above, contact your doctor or pharmacist. In the [place] - Call your doctor for medical advice about side effects. You may report side effects to FDA at 4-701-VSB-5249. In [place] - Call your doctor for medical advice about side effects. You may report side effects to [facility] [place] at 1-427.954.5453.      PRECAUTIONS:  Before taking warfarin, tell your doctor or pharmacist if you are allergic to it; or if you have any other allergies. This product may contain inactive ingredients, which can cause allergic reactions or other problems. Talk to your pharmacist for more details. Before using this medication, tell your doctor or pharmacist your medical history, especially of: blood disorders (such as anemia, hemophilia), bleeding problems (such as bleeding of the stomach/intestines, bleeding in the brain), blood vessel disorders (such as aneurysms), recent major injury/surgery, liver disease, alcohol use, mental/mood disorders (including memory problems), frequent falls/injuries. It is  important that all your doctors and dentists know that you take warfarin. Before having surgery or any medical/dental procedures, tell your doctor or dentist that you are taking this medication and about all the products you use (including prescription drugs, nonprescription drugs, and herbal products). Avoid getting injections into the muscles. If you must have an injection into a muscle (for example, a flu shot), it should be given in the arm. This way, it will be easier to check for bleeding and/or apply pressure bandages. This medication may cause stomach bleeding. Daily use of alcohol while using this medicine will increase your risk for stomach bleeding and may also affect how this medication works. Limit or avoid alcoholic beverages. If you have not been eating well, if you have an illness or infection that causes fever, vomiting, or diarrhea for more than 2 days, or if you start using any antibiotic medications, contact your doctor or pharmacist immediately because these conditions can affect how warfarin works. This medication can cause heavy bleeding. To lower the chance of getting cut, bruised, or injured, use great caution with sharp objects like safety razors and nail cutters. Use an electric razor when shaving and a soft toothbrush when brushing your teeth. Avoid activities such as contact sports. If you fall or injure yourself, especially if you hit your head, call your doctor immediately. Your doctor may need to check you. The Food & Drug Administration has stated that generic warfarin products are interchangeable. However, consult your doctor or pharmacist before switching warfarin products. Be careful not to take more than one medication that contains warfarin unless specifically directed by the doctor or health care provider who is monitoring your warfarin treatment. Older adults may be at greater risk for bleeding while using this drug. This medication is not recommended for use during pregnancy  "because of serious (possibly fatal) harm to an unborn baby. Discuss the use of reliable forms of birth control with your doctor. If you become pregnant or think you may be pregnant, tell your doctor immediately. If you are planning pregnancy, discuss a plan for managing your condition with your doctor before you become pregnant. Your doctor may switch the type of medication you use during pregnancy. Very small amounts of this medication may pass into breast milk but is unlikely to harm a nursing infant. Consult your doctor before breast-feeding.      DRUG INTERACTIONS:  Drug interactions may change how your medications work or increase your risk for serious side effects. This document does not contain all possible drug interactions. Keep a list of all the products you use (including prescription/nonprescription drugs and herbal products) and share it with your doctor and pharmacist. Do not start, stop, or change the dosage of any medicines without your doctor's approval. Warfarin interacts with many prescription, nonprescription, vitamin, and herbal products. This includes medications that are applied to the skin or inside the vagina or rectum. The interactions with warfarin usually result in an increase or decrease in the \"blood-thinning\" (anticoagulant) effect. Your doctor or other health care professional should closely monitor you to prevent serious bleeding or clotting problems. While taking warfarin, it is very important to tell your doctor or pharmacist of any changes in medications, vitamins, or herbal products that you are taking. Some products that may interact with this drug include: capecitabine, imatinib, mifepristone. Aspirin, aspirin-like drugs (salicylates), and nonsteroidal anti-inflammatory drugs (NSAIDs such as ibuprofen, naproxen, celecoxib) may have effects similar to warfarin. These drugs may increase the risk of bleeding problems if taken during treatment with warfarin. Carefully check all " prescription/nonprescription product labels (including drugs applied to the skin such as pain-relieving creams) since the products may contain NSAIDs or salicylates. Talk to your doctor about using a different medication (such as acetaminophen) to treat pain/fever. Low-dose aspirin and related drugs (such as clopidogrel, ticlopidine) should be continued if prescribed by your doctor for specific medical reasons such as heart attack or stroke prevention. Consult your doctor or pharmacist for more details. Many herbal products interact with warfarin. Tell your doctor before taking any herbal products, especially bromelains, coenzyme Q10, cranberry, danshen, dong quai, fenugreek, garlic, ginkgo biloba, ginseng, and Pepper Pike's wort, among others. This medication may interfere with a certain laboratory test to measure theophylline levels, possibly causing false test results. Make sure laboratory personnel and all your doctors know you use this drug.      OVERDOSE:  If overdose is suspected, contact a poison control center or emergency room immediately. [place] residents can call the [place] National Poison Hotline at 1-198.522.9176. [place] residents can call a provincial poison control center. Symptoms of overdose may include: bloody/black/tarry stools, pink/dark urine, unusual/prolonged bleeding.      NOTES:  Do not share this medication with others. Laboratory and/or medical tests (such as INR, complete blood count) must be performed periodically to monitor your progress or check for side effects. Consult your doctor for more details.      MISSED DOSE:  For the best possible benefit, do not miss any doses. If you do miss a dose and remember on the same day, take it as soon as you remember. If you remember on the next day, skip the missed dose and resume your usual dosing schedule. Do not double the dose to catch up because this could increase your risk for bleeding. Keep a record of missed doses to give to your doctor or  pharmacist. Contact your doctor or pharmacist if you miss 2 or more doses in a row.      STORAGE:  Store at room temperature away from light and moisture. Do not store in the bathroom. Keep all medications away from children and pets. Do not flush medications down the toilet or pour them into a drain unless instructed to do so. Properly discard this product when it is  or no longer needed. Consult your pharmacist or local waste disposal company for more details about how to safely discard your product.      MEDICAL ALERT:  Your condition and medication can cause complications in a medical emergency. For information about enrolling in MedicAlert, call 1-571.171.8644 ([place]) or 1-641.355.4385 ([place]).      Information last revised 2010 Copyright(c)  [employer]           Heart Failure  Heart failure means your heart has trouble pumping blood. This makes it hard for your body to work well. Heart failure is usually a long-term (chronic) condition. You must take good care of yourself and follow your doctor's treatment plan.  HOME CARE  · Take your heart medicine as told by your doctor.  ¨ Do not stop taking medicine unless your doctor tells you to.  ¨ Do not skip any dose of medicine.  ¨ Refill your medicines before they run out.  ¨ Take other medicines only as told by your doctor or pharmacist.  · Stay active if told by your doctor. The elderly and people with severe heart failure should talk with a doctor about physical activity.  · Eat heart-healthy foods. Choose foods that are without trans fat and are low in saturated fat, cholesterol, and salt (sodium). This includes fresh or frozen fruits and vegetables, fish, lean meats, fat-free or low-fat dairy foods, whole grains, and high-fiber foods. Lentils and dried peas and beans (legumes) are also good choices.  · Limit salt if told by your doctor.  · Cook in a healthy way. Roast, grill, broil, bake, poach, steam, or stir-woodward foods.  · Limit  fluids as told by your doctor.  · Weigh yourself every morning. Do this after you pee (urinate) and before you eat breakfast. Write down your weight to give to your doctor.  · Take your blood pressure and write it down if your doctor tells you to.  · Ask your doctor how to check your pulse. Check your pulse as told.  · Lose weight if told by your doctor.  · Stop smoking or chewing tobacco. Do not use gum or patches that help you quit without your doctor's approval.  · Schedule and go to doctor visits as told.  · Nonpregnant women should have no more than 1 drink a day. Men should have no more than 2 drinks a day. Talk to your doctor about drinking alcohol.  · Stop illegal drug use.  · Stay current with shots (immunizations).  · Manage your health conditions as told by your doctor.  · Learn to manage your stress.  · Rest when you are tired.  · If it is really hot outside:  ¨ Avoid intense activities.  ¨ Use air conditioning or fans, or get in a cooler place.  ¨ Avoid caffeine and alcohol.  ¨ Wear loose-fitting, lightweight, and light-colored clothing.  · If it is really cold outside:  ¨ Avoid intense activities.  ¨ Layer your clothing.  ¨ Wear mittens or gloves, a hat, and a scarf when going outside.  ¨ Avoid alcohol.  · Learn about heart failure and get support as needed.  · Get help to maintain or improve your quality of life and your ability to care for yourself as needed.  GET HELP IF:   · You gain weight quickly.  · You are more short of breath than usual.  · You cannot do your normal activities.  · You tire easily.  · You cough more than normal, especially with activity.  · You have any or more puffiness (swelling) in areas such as your hands, feet, ankles, or belly (abdomen).  · You cannot sleep because it is hard to breathe.  · You feel like your heart is beating fast (palpitations).  · You get dizzy or light-headed when you stand up.  GET HELP RIGHT AWAY IF:   · You have trouble breathing.  · There is a  change in mental status, such as becoming less alert or not being able to focus.  · You have chest pain or discomfort.  · You faint.  MAKE SURE YOU:   · Understand these instructions.  · Will watch your condition.  · Will get help right away if you are not doing well or get worse.  This information is not intended to replace advice given to you by your health care provider. Make sure you discuss any questions you have with your health care provider.  Document Released: 09/26/2009 Document Revised: 01/08/2016 Document Reviewed: 02/03/2014  [employer] Interactive Patient Education © 2017 [employer]      Furosemide tablets (Take with Potassium as prescribed)  What is this medicine?  FUROSEMIDE (fyoor [place] se mide) is a diuretic. It helps you make more urine and to lose salt and excess water from your body. This medicine is used to treat high blood pressure, and edema or swelling from heart, kidney, or liver disease.  This medicine may be used for other purposes; ask your health care provider or pharmacist if you have questions.  COMMON BRAND NAME(S): Active-Medicated Specimen Kit, Delone, Diuscreen, Lasix, RX Specimen Collection Kit, Specimen Collection Kit, URINX Medicated Specimen Collection  What should I tell my health care provider before I take this medicine?  They need to know if you have any of these conditions:  -abnormal blood electrolytes  -diarrhea or vomiting  -gout  -heart disease  -kidney disease, small amounts of urine, or difficulty passing urine  -liver disease  -thyroid disease  -an unusual or allergic reaction to furosemide, sulfa drugs, other medicines, foods, dyes, or preservatives  -pregnant or trying to get pregnant  -breast-feeding  How should I use this medicine?  Take this medicine by mouth with a glass of water. Follow the directions on the prescription label. You may take this medicine with or without food. If it upsets your stomach, take it with food or milk. Do not take your medicine more  often than directed. Remember that you will need to pass more urine after taking this medicine. Do not take your medicine at a time of day that will cause you problems. Do not take at bedtime.  Talk to your pediatrician regarding the use of this medicine in children. While this drug may be prescribed for selected conditions, precautions do apply.  Overdosage: If you think you have taken too much of this medicine contact a poison control center or emergency room at once.  NOTE: This medicine is only for you. Do not share this medicine with others.  What if I miss a dose?  If you miss a dose, take it as soon as you can. If it is almost time for your next dose, take only that dose. Do not take double or extra doses.  What may interact with this medicine?  -aspirin and aspirin-like medicines  -certain antibiotics  -chloral hydrate  -cisplatin  -cyclosporine  -digoxin  -diuretics  -laxatives  -lithium  -medicines for blood pressure  -medicines that relax muscles for surgery  -methotrexate  -NSAIDs, medicines for pain and inflammation like ibuprofen, naproxen, or indomethacin  -phenytoin  -steroid medicines like prednisone or cortisone  -sucralfate  -thyroid hormones  This list may not describe all possible interactions. Give your health care provider a list of all the medicines, herbs, non-prescription drugs, or dietary supplements you use. Also tell them if you smoke, drink alcohol, or use illegal drugs. Some items may interact with your medicine.  What should I watch for while using this medicine?  Visit your doctor or health care professional for regular checks on your progress. Check your blood pressure regularly. Ask your doctor or health care professional what your blood pressure should be, and when you should contact him or her. If you are a diabetic, check your blood sugar as directed.  You may need to be on a special diet while taking this medicine. Check with your doctor. Also, ask how many glasses of fluid you  need to drink a day. You must not get dehydrated.  You may get drowsy or dizzy. Do not drive, use machinery, or do anything that needs mental alertness until you know how this drug affects you. Do not stand or sit up quickly, especially if you are an older patient. This reduces the risk of dizzy or fainting spells. Alcohol can make you more drowsy and dizzy. Avoid alcoholic drinks.  This medicine can make you more sensitive to the sun. Keep out of the sun. If you cannot avoid being in the sun, wear protective clothing and use sunscreen. Do not use sun lamps or tanning beds/booths.  What side effects may I notice from receiving this medicine?  Side effects that you should report to your doctor or health care professional as soon as possible:  -blood in urine or stools  -dry mouth  -fever or chills  -hearing loss or ringing in the ears  -irregular heartbeat  -muscle pain or weakness, cramps  -skin rash  -stomach upset, pain, or nausea  -tingling or numbness in the hands or feet  -unusually weak or tired  -vomiting or diarrhea  -yellowing of the eyes or skin  Side effects that usually do not require medical attention (report to your doctor or health care professional if they continue or are bothersome):  -headache  -loss of appetite  -unusual bleeding or bruising  This list may not describe all possible side effects. Call your doctor for medical advice about side effects. You may report side effects to FDA at 7-709-FDA-0691.  Where should I keep my medicine?  Keep out of the reach of children.  Store at room temperature between 15 and 30 degrees C (59 and 86 degrees F). Protect from light. Throw away any unused medicine after the expiration date.  NOTE: This sheet is a summary. It may not cover all possible information. If you have questions about this medicine, talk to your doctor, pharmacist, or health care provider.  © 2018 [employer] (2016-03-09 13:49:50)        Depression / Suicide Risk    As you are  discharged [facility], it is important to learn how to keep safe from harming yourself.    Recognize the warning signs:  · Abrupt changes in personality, positive or negative- including increase in energy   · Giving away possessions  · Change in eating patterns- significant weight changes-  positive or negative  · Change in sleeping patterns- unable to sleep or sleeping all the time   · Unwillingness or inability to communicate  · Depression  · Unusual sadness, discouragement and loneliness  · Talk of wanting to die  · Neglect of personal appearance   · Rebelliousness- reckless behavior  · Withdrawal from people/activities they love  · Confusion- inability to concentrate     If you or a loved one observes any of these behaviors or has concerns about self-harm, here's what you can do:  · Talk about it- your feelings and reasons for harming yourself  · Remove any means that you might use to hurt yourself (examples: pills, rope, extension cords, firearm)  · Get professional help from the community (Mental Health, Substance Abuse, psychological counseling)  · Do not be alone:Call your Safe Contact- someone whom you trust who will be there for you.  · Call your local CRISIS HOTLINE 542-4511 or 686-411-6023  · Call your local Children's Mobile Crisis Response Team [place] (312) 092-2098 or www.[employer]  · Call the toll free National Suicide Prevention Hotlines   · National Suicide Prevention Lifeline 229-714-MLMS (4258)  · National Hope Line Network 800-SUICIDE (182-6298)

## 2019-03-23 NOTE — DISCHARGE SUMMARY
Discharge Summary    CHIEF COMPLAINT ON ADMISSION  Chief Complaint   Patient presents with   • Cough       Reason for Admission  Coughing Up Blood     Admission Date  3/22/2019    CODE STATUS  Prior    HPI & HOSPITAL COURSE  This is a 87 y.o. female here with Shortness of breath and cough. She  Was admitted with evidence for acute on chronic respiratory failure and acute diastolic CHF. She was diuresed and improved extremely rapidly with this therapy. In fact much faster that expected with her conditions. She returned back to her baseline O2 requirements and her symptoms resolved. She will be discharged home and close follow up with her PCP and was encouraged to take her lasix and other meds.         Therefore, she is discharged in good and stable condition to home with close outpatient follow-up.    The patient recovered much more quickly than anticipated on admission.    Discharge Date  3/23/2019    FOLLOW UP ITEMS POST DISCHARGE  none    DISCHARGE DIAGNOSES  Active Problems:    Bilateral pleural effusion POA: Unknown    Acute respiratory failure with hypoxia (HCC) POA: Unknown    Atrial fibrillation (HCC) POA: Unknown    Hypertension POA: Unknown    Diastolic heart failure (HCC) POA: Unknown  Resolved Problems:    * No resolved hospital problems. *      FOLLOW UP  No future appointments.  PCP in [place]    On 4/1/2019      Pcp Not In Computer            MEDICATIONS ON DISCHARGE     Medication List      START taking these medications      Instructions   furosemide 40 MG Tabs  Commonly known as:  LASIX   Take 1 Tab by mouth every day.  Dose:  40 mg     potassium chloride SA 20 MEQ Tbcr  Commonly known as:  Kdur   Take 2 Tabs by mouth every day.  Dose:  40 mEq        CONTINUE taking these medications      Instructions   acetaminophen 500 MG Tabs  Commonly known as:  TYLENOL   Take 1,000 mg by mouth every 6 hours as needed.  Dose:  1000 mg     aspirin EC 81 MG Tbec  Commonly known as:  ECOTRIN   Take 81 mg by mouth  every day.  Dose:  81 mg     budesonide-formoterol 80-4.5 MCG/ACT Aero  Commonly known as:  SYMBICORT   Inhale 2 Puffs by mouth 2 Times a Day.  Dose:  2 Puff     Calcium-Vitamin D 600-400 MG-UNIT Tabs   Take 1 Tab by mouth every day.  Dose:  1 Tab     dorzolamide-timolol 22.3-6.8 MG/ML Soln  Commonly known as:  COSOPT   Place 1 Drop in right eye 2 times a day.  Dose:  1 Drop     fish oil 1000 MG Caps capsule   Take 1,000 mg by mouth 3 times a day, with meals.  Dose:  1000 mg     irbesartan 75 MG tablet  Commonly known as:  AVAPRO   Take 75 mg by mouth every evening.  Dose:  75 mg     latanoprost 0.005 % Soln  Commonly known as:  XALATAN   Place 1 Drop in right eye every bedtime.  Dose:  1 Drop     levothyroxine 88 MCG Tabs  Commonly known as:  SYNTHROID   Take 88 mcg by mouth Every morning on an empty stomach.  Dose:  88 mcg     metoprolol SR 50 MG Tb24  Commonly known as:  TOPROL XL   Take 75 mg by mouth every morning.  Dose:  75 mg     rosuvastatin 20 MG Tabs  Commonly known as:  CRESTOR   Take 20 mg by mouth every evening.  Dose:  20 mg     therapeutic multivitamin-minerals Tabs   Take 1 Tab by mouth every day.  Dose:  1 Tab     warfarin 5 MG Tabs  Commonly known as:  COUMADIN   Take 2.5-5 Tabs by mouth every day. 2.5 MG MON,WED,FRI 5 MG all other days  Dose:  2.5-5 Tab            Allergies  No Known Allergies    DIET  No orders of the defined types were placed in this encounter.      ACTIVITY  As tolerated.  Weight bearing as tolerated    CONSULTATIONS  none    PROCEDURES  none    LABORATORY  Lab Results   Component Value Date    SODIUM 141 03/23/2019    POTASSIUM 3.7 03/23/2019    CHLORIDE 100 03/23/2019    CO2 32 03/23/2019    GLUCOSE 104 (H) 03/23/2019    BUN 10 03/23/2019    CREATININE 0.56 03/23/2019        Lab Results   Component Value Date    WBC 7.2 03/23/2019    HEMOGLOBIN 12.9 03/23/2019    HEMATOCRIT 40.4 03/23/2019    PLATELETCT 148 (L) 03/23/2019        Total time of the discharge process exceeds 36  minutes.

## 2019-03-23 NOTE — ASSESSMENT & PLAN NOTE
-Right side is relatively large, this is likely causing most of her symptoms  -At this point in time I do not think she needs a thoracentesis but would certainly do better with IV Lasix  -IV Lasix has been started while in the hospital, patient would likely require oral Lasix at discharge  -Likely due to diastolic heart failure and increase in elevation

## 2019-03-23 NOTE — PROGRESS NOTES
· 2 RN skin check complete with ROSANGELA rosen. Generalized dry skin  · Devices in place na.  · Skin assessed under devices na.  · Confirmed pressure ulcers found on na.  · New potential pressure ulcers noted on na. Wound consult placed and wound reported.  · The following interventions in place na*

## 2019-03-23 NOTE — H&P
Hospital Medicine History & Physical Note    Date of Service  3/22/2019    Primary Care Physician  In [place]     Consultants  None    Code Status  Full    Chief Complaint  Shortness of breath and cough    History of Presenting Illness  87 y.o. female who presented 3/22/2019 with shortness of breath and cough.  Patient lives in [facility], has been here visiting for approximately 3 weeks.  About 4 or 5 days ago she developed a cough, is worse at night.  She is also complained of exertional dyspnea.  This morning she had a significant amount of hemoptysis but this resolved.  She has been sleeping flat on one pillow but again states that she has a worse cough and shortness of breath during that time.  She denied any fever or chills.  She has had some sick contacts with cold-like symptoms but these were short lasting.  Patient did have a couple days with the diarrhea but this resolved.  Upon arrival, patient did have a CTA of the chest, no pulmonary embolism but there is bilateral pleural effusions.  Of note, patient does have significant information under care everywhere.    Review of Systems  Review of Systems   Constitutional: Negative for chills, fever and malaise/fatigue.   HENT: Negative for congestion.    Respiratory: Positive for cough and shortness of breath. Negative for sputum production and stridor.    Cardiovascular: Negative for chest pain, palpitations and leg swelling.   Gastrointestinal: Negative for abdominal pain, constipation, diarrhea, nausea and vomiting.   Genitourinary: Negative for dysuria and urgency.   Musculoskeletal: Negative for falls and myalgias.   Neurological: Negative for dizziness, tingling, loss of consciousness, weakness and headaches.   Psychiatric/Behavioral: Negative for depression and suicidal ideas.   All other systems reviewed and are negative.      Past Medical History   has a past medical history of Atrial fibrillation (HCC) and Hypertension.    Surgical  History  None    Family History  Reviewed, noncontributory    Social History   reports that she has never smoked. She has never used smokeless tobacco. She reports that she drinks alcohol. She reports that she does not use drugs.    Allergies  No Known Allergies    Medications  Prior to Admission Medications   Prescriptions Last Dose Informant Patient Reported? Taking?   Calcium Carb-Cholecalciferol (CALCIUM-VITAMIN D) 600-400 MG-UNIT Tab 3/21/2019 at 1200 Patient Yes Yes   Sig: Take 1 Tab by mouth every day.   Omega-3 Fatty Acids (FISH OIL) 1000 MG Cap capsule 3/22/2019 at AM Patient Yes Yes   Sig: Take 1,000 mg by mouth 3 times a day, with meals.   acetaminophen (TYLENOL) 500 MG Tab 3/21/2019 at PM Patient Yes Yes   Sig: Take 1,000 mg by mouth every 6 hours as needed.   aspirin EC (ECOTRIN) 81 MG Tablet Delayed Response 3/22/2019 at AM Patient Yes Yes   Sig: Take 81 mg by mouth every day.   budesonide-formoterol (SYMBICORT) 80-4.5 MCG/ACT Aerosol 3/22/2019 at AM Patient Yes Yes   Sig: Inhale 2 Puffs by mouth 2 Times a Day.   dorzolamide-timolol (COSOPT) 22.3-6.8 MG/ML Solution 3/22/2019 at AM Patient Yes Yes   Sig: Place 1 Drop in right eye 2 times a day.   irbesartan (AVAPRO) 75 MG tablet 3/21/2019 at PM Patient Yes Yes   Sig: Take 75 mg by mouth every evening.   latanoprost (XALATAN) 0.005 % Solution 3/21/2019 at HS Patient Yes No   Sig: Place 1 Drop in right eye every bedtime.   levothyroxine (SYNTHROID) 88 MCG Tab 3/22/2019 at AM Patient Yes Yes   Sig: Take 88 mcg by mouth Every morning on an empty stomach.   metoprolol SR (TOPROL XL) 50 MG TABLET SR 24 HR 3/22/2019 at AM Patient Yes Yes   Sig: Take 75 mg by mouth every morning.   rosuvastatin (CRESTOR) 20 MG Tab 3/21/2019 at PM Patient Yes Yes   Sig: Take 20 mg by mouth every evening.   therapeutic multivitamin-minerals (THERAGRAN-M) Tab 3/21/2019 at 1200 Patient Yes Yes   Sig: Take 1 Tab by mouth every day.   warfarin (COUMADIN) 5 MG Tab 3/21/2019 at PM  Patient Yes Yes   Sig: Take 2.5-5 Tabs by mouth every day. 2.5 MG MON,WED,FRI  5 MG all other days      Facility-Administered Medications: None       Physical Exam  Temp:  [36.6 °C (97.8 °F)] 36.6 °C (97.8 °F)  Pulse:  [] 60  Resp:  [19-24] 24  BP: (171-184)/() 177/89  SpO2:  [92 %-98 %] 97 %    Physical Exam   Constitutional: She is oriented to person, place, and time. She appears well-developed. She is cooperative. No distress.   HENT:   Head: Normocephalic and atraumatic. Not macrocephalic and not microcephalic. Head is without raccoon's eyes and without Raman's sign.   Right Ear: External ear normal.   Left Ear: External ear normal.   Mouth/Throat: Oropharynx is clear and moist. No oropharyngeal exudate.   Eyes: Conjunctivae are normal. Right eye exhibits no discharge. Left eye exhibits no discharge. No scleral icterus.   Neck: Neck supple. No tracheal deviation present.   Cardiovascular: Normal rate and intact distal pulses.  An irregularly irregular rhythm present. Exam reveals no gallop, no distant heart sounds and no friction rub.    No murmur heard.  Pulmonary/Chest: Effort normal. No accessory muscle usage or stridor. No tachypnea and no bradypnea. No respiratory distress. She has no decreased breath sounds. She has no wheezes. She has no rhonchi. She has rales in the right middle field and the right lower field. She exhibits no tenderness.   Abdominal: Soft. Bowel sounds are normal. She exhibits no distension. There is no hepatosplenomegaly, splenomegaly or hepatomegaly. There is no tenderness. There is no rebound and no guarding.   Musculoskeletal: Normal range of motion. She exhibits no edema or tenderness.   Lymphadenopathy:     She has no cervical adenopathy.   Neurological: She is alert and oriented to person, place, and time. No cranial nerve deficit. She displays no seizure activity.   Skin: Skin is warm, dry and intact. No rash noted. She is not diaphoretic. No erythema. No pallor.    Psychiatric: She has a normal mood and affect. Her speech is normal and behavior is normal. Judgment and thought content normal. Cognition and memory are normal.   Nursing note and vitals reviewed.      Laboratory:  Recent Labs      03/22/19   1225   WBC  7.2   RBC  4.12*   HEMOGLOBIN  13.5   HEMATOCRIT  42.8   MCV  103.9*   MCH  32.8   MCHC  31.5*   RDW  55.2*   PLATELETCT  147*   MPV  10.4     Recent Labs      03/22/19   1225   SODIUM  135   POTASSIUM  4.1   CHLORIDE  101   CO2  29   GLUCOSE  115*   BUN  16   CREATININE  0.51   CALCIUM  8.6     Recent Labs      03/22/19   1225   ALTSGPT  21   ASTSGOT  30   ALKPHOSPHAT  58   TBILIRUBIN  0.9   GLUCOSE  115*     Recent Labs      03/22/19   1225   APTT  39.4*   INR  1.89*     Recent Labs      03/22/19   1225   BNPBTYPENAT  393*         Recent Labs      03/22/19   1225   TROPONINI  <0.02       Urinalysis:    No results found     Imaging:  CT-CTA CHEST PULMONARY ARTERY W/ RECONS   Final Result      1.  No CT evidence for pulmonary emboli.   2.  Multichamber cardiac enlargement primarily involving the atria with findings suggesting pulmonary hypertension.   3.  Patchy hazy opacities throughout both lungs which may indicate pneumonitis and/or interstitial lung disease.   4.  Small bilateral pleural effusions, larger on the RIGHT.            DX-CHEST-PORTABLE (1 VIEW)   Final Result      Interstitial greater than consolidative edema with underlying interstitial lung disease not excluded. Superimposed pneumonia is also possible      Marked cardiac silhouette enlargement            Assessment/Plan:  I anticipate this patient will require at least two midnights for appropriate medical management, necessitating inpatient admission.    Acute respiratory failure with hypoxia (HCC)   Assessment & Plan    -Due to bilateral pleural effusions, right greater than left  -Continue oxygen, wean as able, patient quickly desaturates without     Bilateral pleural effusion   Assessment &  Plan    -Right side is relatively large, this is likely causing most of her symptoms  -At this point in time I do not think she needs a thoracentesis but would certainly do better with IV Lasix  -IV Lasix has been started while in the hospital, patient would likely require oral Lasix at discharge  -Likely due to diastolic heart failure and increase in elevation     Diastolic heart failure (HCC)   Assessment & Plan    -Patient will likely require ongoing Lasix, start IV Lasix  -Moderate per recent echocardiogram     Hypertension   Assessment & Plan    -Controlled on home metoprolol  -Place PRN hydralazine  -Adjust meds as needed     Atrial fibrillation (HCC)   Assessment & Plan    -Rate controlled on metoprolol, continue home dose  -Continue Coumadin         VTE prophylaxis: Coumadin

## 2019-03-23 NOTE — PROGRESS NOTES
Telemetry Shift Summary    Rhythm : A fib with BBB  HR Range 70-80  Ectopy frequent PVCs  Measurements -/0.14/0.44        Normal Values  Rhythm SR  HR Range    Measurements 0.12-0.20 / 0.06-0.10  / 0.30-0.52

## 2019-03-23 NOTE — CARE PLAN
Problem: Safety  Goal: Will remain free from injury  Outcome: PROGRESSING AS EXPECTED  Patient calls appropriately, bed in low position, all needs met, no injury    Problem: Respiratory:  Goal: Respiratory status will improve  Outcome: PROGRESSING AS EXPECTED  Has mild SOB with exertion, at baseline oxygen demand 2.5 l/min     Problem: Urinary Elimination:  Goal: Ability to reestablish a normal urinary elimination pattern will improve  Outcome: PROGRESSING AS EXPECTED  Having increased output as per MD order for diuresis

## 2019-03-23 NOTE — PROGRESS NOTES
Inpatient Anticoagulation Service Note    Date: 3/23/2019  Reason for Anticoagulation: Atrial Fibrillation   [id number] VASc Score: 4        Hemoglobin Value: 12.9  Hematocrit Value: 40.4  Lab Platelet Value: (!) 148  Target INR: 2.0 to 3.0    INR from last 7 days     Date/Time INR Value    03/23/19 0407 (!)  1.96    03/22/19 1225 (!)  1.89        Dose from last 7 days     Date/Time Dose (mg)    03/23/19 0700  4    03/22/19 1742  5        Significant Interactions: Aspirin, Statin  Bridge Therapy: No (If less than 5 days and overlap therapy discontinued -- document reason (i.e. Bleed Risk))    (If still on overlap therapy, if No -- document reason (i.e. Bleed Risk))    Comments: Home dose 2.5 mg MWF, 5 mg on all other days.    Plan:  Will give warfarin 4 mg po tonight for INR of 1.96  Education Material Provided?: No (long term warfarin patient.)  Pharmacist suggested discharge dosing: Resume home regimen     Kin Orozco [facility]

## 2019-03-23 NOTE — PROGRESS NOTES
Patient arrived on floor. Ambulated to bathroom with FWW and SB assist. Patient oriented to floor and fall precaution. Family at the bedside.tele placed. 2 RN skin check completed. Bed in low locked position, call bell within reach. Continue to monitor.

## 2019-03-23 NOTE — PROGRESS NOTES
Discharge instructions given to patient and son, they verbalized understanding, new RX at [facility], she is to follow up with her MDs in [place], patient down to car via wheelchair with Nicolas OSEGUERA

## 2019-03-23 NOTE — CARE PLAN
Problem: Safety  Goal: Will remain free from falls  Outcome: PROGRESSING AS EXPECTED  Pt educated to call when in need of assistance. Bed alarm is set. Pt calls before attempting to get out of bed. Pt oriented to room and unit.     Problem: Knowledge Deficit  Goal: Knowledge of disease process/condition, treatment plan, diagnostic tests, and medications will improve  Outcome: PROGRESSING AS EXPECTED  Discussed POC and medications with pt and family. Answered all questions.